# Patient Record
Sex: FEMALE | Race: WHITE | NOT HISPANIC OR LATINO | Employment: UNEMPLOYED | URBAN - METROPOLITAN AREA
[De-identification: names, ages, dates, MRNs, and addresses within clinical notes are randomized per-mention and may not be internally consistent; named-entity substitution may affect disease eponyms.]

---

## 2024-03-26 PROBLEM — R22.9 SUBCUTANEOUS MASS: Status: ACTIVE | Noted: 2024-03-26

## 2024-03-26 PROBLEM — S93.601A SPRAIN OF RIGHT FOOT: Status: ACTIVE | Noted: 2024-03-26

## 2024-03-26 PROBLEM — R03.0 ELEVATED BLOOD PRESSURE READING: Status: ACTIVE | Noted: 2024-03-26

## 2024-03-26 PROBLEM — S61.259A DOG BITE OF FINGER: Status: ACTIVE | Noted: 2024-03-26

## 2024-03-26 PROBLEM — H93.8X1 SENSATION OF PLUGGED EAR ON RIGHT SIDE: Status: ACTIVE | Noted: 2024-03-26
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
## 2024-03-26 PROBLEM — S69.91XA INJURY OF RIGHT INDEX FINGER: Status: ACTIVE | Noted: 2024-03-26

## 2024-03-26 PROBLEM — E11.9 DIABETES TYPE 2, CONTROLLED (MULTI): Status: ACTIVE | Noted: 2024-03-26

## 2024-03-26 PROBLEM — E66.9 OBESITY (BMI 30-39.9): Status: ACTIVE | Noted: 2024-03-26

## 2024-04-02 ENCOUNTER — OFFICE VISIT (OUTPATIENT)
Dept: CARDIOLOGY | Facility: CLINIC | Age: 39
End: 2024-04-02
Payer: COMMERCIAL

## 2024-04-02 VITALS
WEIGHT: 232 LBS | BODY MASS INDEX: 37.45 KG/M2 | SYSTOLIC BLOOD PRESSURE: 154 MMHG | OXYGEN SATURATION: 97 % | HEART RATE: 87 BPM | DIASTOLIC BLOOD PRESSURE: 92 MMHG

## 2024-04-02 DIAGNOSIS — I10 PRIMARY HYPERTENSION: Primary | ICD-10-CM

## 2024-04-02 DIAGNOSIS — Z79.4 CONTROLLED TYPE 2 DIABETES MELLITUS WITH COMPLICATION, WITH LONG-TERM CURRENT USE OF INSULIN (MULTI): ICD-10-CM

## 2024-04-02 DIAGNOSIS — E66.9 OBESITY (BMI 30-39.9): ICD-10-CM

## 2024-04-02 DIAGNOSIS — E11.8 CONTROLLED TYPE 2 DIABETES MELLITUS WITH COMPLICATION, WITH LONG-TERM CURRENT USE OF INSULIN (MULTI): ICD-10-CM

## 2024-04-02 PROCEDURE — 1036F TOBACCO NON-USER: CPT | Performed by: STUDENT IN AN ORGANIZED HEALTH CARE EDUCATION/TRAINING PROGRAM

## 2024-04-02 PROCEDURE — 99204 OFFICE O/P NEW MOD 45 MIN: CPT | Performed by: STUDENT IN AN ORGANIZED HEALTH CARE EDUCATION/TRAINING PROGRAM

## 2024-04-02 PROCEDURE — 4010F ACE/ARB THERAPY RXD/TAKEN: CPT | Performed by: STUDENT IN AN ORGANIZED HEALTH CARE EDUCATION/TRAINING PROGRAM

## 2024-04-02 PROCEDURE — 3080F DIAST BP >= 90 MM HG: CPT | Performed by: STUDENT IN AN ORGANIZED HEALTH CARE EDUCATION/TRAINING PROGRAM

## 2024-04-02 PROCEDURE — 3077F SYST BP >= 140 MM HG: CPT | Performed by: STUDENT IN AN ORGANIZED HEALTH CARE EDUCATION/TRAINING PROGRAM

## 2024-04-02 RX ORDER — ONDANSETRON 4 MG/1
TABLET, ORALLY DISINTEGRATING ORAL
COMMUNITY
Start: 2024-03-19

## 2024-04-02 RX ORDER — LANOLIN ALCOHOL/MO/W.PET/CERES
1 CREAM (GRAM) TOPICAL DAILY
COMMUNITY
Start: 2024-03-11

## 2024-04-02 RX ORDER — ERYTHROMYCIN ETHYLSUCCINATE 200 MG/5ML
SUSPENSION ORAL
COMMUNITY
Start: 2024-03-25

## 2024-04-02 RX ORDER — CALCIPOTRIENE 50 UG/G
OINTMENT TOPICAL
COMMUNITY
Start: 2021-12-13

## 2024-04-02 RX ORDER — CLOBETASOL PROPIONATE 0.05 G/100ML
SHAMPOO TOPICAL
COMMUNITY
Start: 2024-03-21

## 2024-04-02 RX ORDER — INSULIN LISPRO 100 [IU]/ML
INJECTION, SOLUTION INTRAVENOUS; SUBCUTANEOUS
COMMUNITY
Start: 2022-11-07

## 2024-04-02 RX ORDER — PANTOPRAZOLE SODIUM 40 MG/1
40 TABLET, DELAYED RELEASE ORAL 2 TIMES DAILY
COMMUNITY

## 2024-04-02 RX ORDER — BLOOD-GLUCOSE TRANSMITTER
EACH MISCELLANEOUS
COMMUNITY
Start: 2024-01-14

## 2024-04-02 RX ORDER — BLOOD-GLUCOSE SENSOR
EACH MISCELLANEOUS
COMMUNITY
Start: 2024-03-15

## 2024-04-02 RX ORDER — GABAPENTIN 300 MG/1
300 CAPSULE ORAL 2 TIMES DAILY
COMMUNITY

## 2024-04-02 RX ORDER — ESCITALOPRAM OXALATE 10 MG/1
TABLET ORAL
COMMUNITY

## 2024-04-02 RX ORDER — LOSARTAN POTASSIUM 100 MG/1
100 TABLET ORAL DAILY
Qty: 90 TABLET | Refills: 3 | Status: SHIPPED | OUTPATIENT
Start: 2024-04-02 | End: 2025-04-02

## 2024-04-02 RX ORDER — LOSARTAN POTASSIUM 50 MG/1
50 TABLET ORAL DAILY
COMMUNITY
Start: 2024-03-08 | End: 2024-04-02 | Stop reason: SDUPTHER

## 2024-04-02 RX ORDER — INSULIN GLARGINE 100 [IU]/ML
50 INJECTION, SOLUTION SUBCUTANEOUS NIGHTLY
COMMUNITY

## 2024-04-02 RX ORDER — LEVOTHYROXINE SODIUM 175 UG/1
175 TABLET ORAL DAILY
COMMUNITY

## 2024-04-02 RX ORDER — ERGOCALCIFEROL 1.25 MG/1
50000 CAPSULE ORAL WEEKLY
COMMUNITY

## 2024-04-02 RX ORDER — BLOOD-GLUCOSE METER
EACH MISCELLANEOUS
COMMUNITY
Start: 2024-03-19

## 2024-04-02 ASSESSMENT — ENCOUNTER SYMPTOMS
PSYCHIATRIC NEGATIVE: 1
ENDOCRINE NEGATIVE: 1
NEAR-SYNCOPE: 0
MUSCULOSKELETAL NEGATIVE: 1
SYNCOPE: 0
HEMATOLOGIC/LYMPHATIC NEGATIVE: 1
ALLERGIC/IMMUNOLOGIC NEGATIVE: 1
CONSTITUTIONAL NEGATIVE: 1
DYSPNEA ON EXERTION: 0
NEUROLOGICAL NEGATIVE: 1
ORTHOPNEA: 0
PND: 0
RESPIRATORY NEGATIVE: 1
PALPITATIONS: 0
EYES NEGATIVE: 1
HEARTBURN: 1

## 2024-04-02 NOTE — PATIENT INSTRUCTIONS
For your high blood pressure we will check a heart and kidney ultrasound.     For your high blood pressure treatment we will increase losartan to 100 mg daily.     We will see you back in heart clinic after your testing.     Please call my nurse Alexus with any questions; her contact information is below.     Thank you for your visit today. Please contact our office (via [employer] or phone) with any additional questions.     [facility] [place]    EVERARDO Vaughan/Clinic Nurse for:    Dr. Titus Levine    [address], [address]  [place] [address]    Phone: [phone] Press Option 5 then Option 3 to speak with the Clinic Nurse (Alexus)    _____    To Reach:    Billing Questions -    [phone]  Scheduling / Rescheduling -  Option 1  Refills / Medication Requests -  Option 3  General Office /  -  Option 4  Results -     Option 6  Medical Records -    Option 7  Repeat Options -    Option 9

## 2024-04-02 NOTE — PROGRESS NOTES
Cardiology New Patient History and Physical    Reason for referral: Hypertension    HPI: Tk Barrientos is a 38 y.o.  female who presents today for hypertension. Past medical history of hypertension, Type II DM on insulin, GERD, hypothyroidism, and obesity.     Tk presented to cardiology clinic on 2024.  Patient notes recent hypertension. Initially treated with lisinopril > stopped due to allergy.  Switched to losartan 50 mg daily.  BP running 140-150/90 mmHg.  Patient notes intermittent epigastric pain that she attributes to GERD.  She also notes right arm paresthesias over the last couple years.  Patient has been Type II DM for ~ 10 years; currently on insulin.  Former tobacco smoker (14 pack year history; quit 3 years ago); occasional marijuana use.    Past Medical History:   - As above    Surgical History:   She has a past surgical history that includes Tonsillectomy (2018);  section, classic (2018); Other surgical history (2020); and Other surgical history (2020).    Family History:   Family History   Problem Relation Name Age of Onset    Diabetes Mother      Colon cancer Mother           age 57 from colon cancer    Diabetes Father           from COVID / Pneumonia    Kidney disease Father       Allergies:  Kiwi and Lisinopril     Social History:   - Non smoker; no illicit drug use  - 4 kids    Prior Cardiovascular Testing (personally reviewed):     ECG (3/18/2024)- sinus rhythm; prolonged Qtc (497 ms)        Review of Systems:  Review of Systems   Constitutional: Negative.   HENT: Negative.     Eyes: Negative.    Cardiovascular:  Negative for dyspnea on exertion, near-syncope, orthopnea, palpitations, paroxysmal nocturnal dyspnea and syncope.        + atypical chest pain (epigastric > patient attributes to GERD); right arm pains   Respiratory: Negative.     Endocrine: Negative.    Hematologic/Lymphatic: Negative.    Skin: Negative.    Musculoskeletal:  Negative.    Gastrointestinal:  Positive for heartburn.   Genitourinary: Negative.    Neurological: Negative.    Psychiatric/Behavioral: Negative.     Allergic/Immunologic: Negative.        Objective     Outpatient Medications:    Current Outpatient Medications:     calcipotriene (Dovonex) 0.005 % ointment, APPLY TOPICALLY 2 TIMES DAILY. APPLY THIN LAYER TO AFFECTED AREA., Disp: , Rfl:     clobetasoL 0.05 % shampoo, Apply topically once daily., Disp: , Rfl:     Dexcom G6 Sensor device, CHANGE SENSOR EVERY 10 DAYS TO CHECK BLOOD SUGARS, Disp: , Rfl:     Dexcom G6 Transmitter device, USE ONE FOR 3 MONTHS AS DIRECTED.*09/25 PER INS, Disp: , Rfl:     ergocalciferol (Vitamin D-2) 1.25 MG (52260 UT) capsule, Take 1 capsule (50,000 Units) by mouth once a week., Disp: , Rfl:     erythromycin ethylsuccinate (EES) 200 mg/5 mL suspension, PLEASE SEE ATTACHED FOR DETAILED DIRECTIONS, Disp: , Rfl:     escitalopram (Lexapro) 10 mg tablet, TAKE 1 TABLET (10 MG) BY MOUTH DAILY., Disp: , Rfl:     gabapentin (Neurontin) 300 mg capsule, Take 1 capsule (300 mg) by mouth 2 times a day., Disp: , Rfl:     insulin lispro (HumaLOG) 100 unit/mL injection, 12-12-16 with meals, Subcutaneous, TIDAC, plus sliding scale and extra based on carbs. May take upto 75 units a day, 45 mL, Date: 08/07/2023 09:28:00 EDT, [facility]/pharmacy #[id number], Injection, 12-12-16 with meals Subcutaneous TIDAC,Instr:plus sliding scale a..., Disp: , Rfl:     Lantus U-100 Insulin 100 unit/mL injection, Inject 50 Units under the skin once daily at bedtime., Disp: , Rfl:     levothyroxine (Synthroid, Levoxyl) 175 mcg tablet, Take 1 tablet (175 mcg) by mouth once daily., Disp: , Rfl:     losartan (Cozaar) 50 mg tablet, Take 1 tablet (50 mg) by mouth once daily., Disp: , Rfl:     magnesium oxide (Mag-Ox) 400 mg (241.3 mg magnesium) tablet, Take 1 tablet (400 mg) by mouth once daily. Take with food., Disp: , Rfl:     ondansetron ODT (Zofran-ODT) 4 mg disintegrating tablet, DISSOLVE  1 TABLET ON THE TONGUE EVERY 8 HOURS AS NEEDED, Disp: , Rfl:     OneTouch Verio test strips strip, USE TO TEST ONCE DAILY, Disp: , Rfl:     pantoprazole (ProtoNix) 40 mg EC tablet, Take 1 tablet (40 mg) by mouth 2 times a day., Disp: , Rfl:      Last Recorded Vitals  BP (!) 154/92 (BP Location: Right arm, Patient Position: Sitting, BP Cuff Size: Adult)   Pulse 87   Wt 105 kg (232 lb)   SpO2 97%   BMI 37.45 kg/m²     Physical Exam:  Physical Exam  Constitutional:       General: She is not in acute distress.     Appearance: She is obese.   HENT:      Head: Normocephalic.      Mouth/Throat:      Mouth: Mucous membranes are moist.   Eyes:      Extraocular Movements: Extraocular movements intact.      Conjunctiva/sclera: Conjunctivae normal.   Neck:      Vascular: No carotid bruit or JVD.   Cardiovascular:      Rate and Rhythm: Normal rate and regular rhythm.      Pulses: Normal pulses.      Heart sounds: No murmur heard.  Pulmonary:      Effort: Pulmonary effort is normal. No respiratory distress.      Breath sounds: Normal breath sounds.   Abdominal:      General: Bowel sounds are normal. There is no distension.      Palpations: Abdomen is soft.   Musculoskeletal:         General: No swelling.   Skin:     General: Skin is warm and dry.   Neurological:      General: No focal deficit present.      Mental Status: She is alert.      Cranial Nerves: No cranial nerve deficit.      Motor: No weakness.   Psychiatric:         Mood and Affect: Mood normal.         Behavior: Behavior normal.         Lab Review:    Lipid Panel (9/18/2023)- 156, HDL 32, LDL 93, triglycerides 156 mg/dl    HgbA1c 8.3% (9/2023)    Assessment:   38 y.o.  female who presents today for hypertension. Past medical history of hypertension, Type II DM on insulin, GERD, hypothyroidism, and obesity.     Patient with suboptimally controlled hypertension.  Will uptitrate losartan to 100 mg daily.  Check hCG given delayed period to assess for  pregnancy.  If patient is pregnant losartan would have to be stopped.  Check transthoracic echocardiogram and renal artery ultrasound for further evaluation of hypertension.    Overall Plan:  1.  Hypertension (goal blood pressure less than 130/90 mmHg)  - Lisinopril previously stopped secondary to allergy  - Hypertension remains suboptimally controlled  - Increase losartan to 100 mg daily  - If hypertension suboptimally controlled with then consider amlodipine or spironolactone  - Check complete transthoracic echocardiogram and renal artery ultrasound; check plasma metanephrines    2.  Delayed menstrual cycle  - Check urine hCG to screen for pregnancy  - If patient is pregnant would then need to stop losartan and switch to nifedipine    3.  Diabetes mellitus on insulin  - Continue insulin as per endocrinology  - Consider semaglutide given obesity and diabetes    4.  Hypothyroidism  - Continue levothyroxine as per endocrinology    5.  Obesity  - Discussed dietary and lifestyle modifications  - Encouraged regular physical activity    Disposition: Return to cardiology clinic after above testing    Javier Qureshi MD

## 2024-04-03 ENCOUNTER — HOSPITAL ENCOUNTER (OUTPATIENT)
Dept: VASCULAR MEDICINE | Facility: CLINIC | Age: 39
Discharge: HOME | End: 2024-04-03
Payer: COMMERCIAL

## 2024-04-03 DIAGNOSIS — I10 PRIMARY HYPERTENSION: ICD-10-CM

## 2024-04-29 ENCOUNTER — HOSPITAL ENCOUNTER (OUTPATIENT)
Dept: VASCULAR MEDICINE | Facility: CLINIC | Age: 39
Discharge: HOME | End: 2024-04-29
Payer: COMMERCIAL

## 2024-04-29 DIAGNOSIS — I10 PRIMARY HYPERTENSION: ICD-10-CM

## 2024-04-29 PROCEDURE — 93975 VASCULAR STUDY: CPT | Performed by: SURGERY

## 2024-04-29 PROCEDURE — 93975 VASCULAR STUDY: CPT

## 2024-05-10 ENCOUNTER — TELEPHONE (OUTPATIENT)
Dept: CARDIOLOGY | Facility: CLINIC | Age: 39
End: 2024-05-10
Payer: COMMERCIAL

## 2024-05-10 NOTE — TELEPHONE ENCOUNTER
----- Message from Javier Qureshi MD sent at 5/9/2024  6:31 PM EDT -----  Please let patient know that her renal artery ultrasound showed normal blood flow to the kidneys; incidental finding of kidney cyst.    Javier Qureshi MD    ----- Message -----  From: Ric, Syngo - Cardiology Results In  Sent: 5/1/2024   6:41 PM EDT  To: Javier Qureshi MD

## 2024-05-30 ENCOUNTER — APPOINTMENT (OUTPATIENT)
Dept: CARDIOLOGY | Facility: CLINIC | Age: 39
End: 2024-05-30
Payer: COMMERCIAL

## 2024-07-08 PROBLEM — E11.43 DIABETIC GASTROPARESIS (MULTI): Status: ACTIVE | Noted: 2024-03-18

## 2024-07-08 PROBLEM — E63.9 POOR DIET: Status: ACTIVE | Noted: 2024-07-08

## 2024-07-08 PROBLEM — R13.10 DYSPHAGIA: Status: ACTIVE | Noted: 2024-07-08

## 2024-07-08 PROBLEM — E27.8 ADRENAL MASS (MULTI): Status: ACTIVE | Noted: 2024-07-08

## 2024-07-08 PROBLEM — Z91.199 NON-ADHERENCE TO MEDICAL TREATMENT: Status: ACTIVE | Noted: 2024-07-08

## 2024-07-08 PROBLEM — F41.9 ANXIETY: Status: ACTIVE | Noted: 2024-07-08

## 2024-07-08 PROBLEM — L40.9 PSORIASIS: Status: ACTIVE | Noted: 2023-05-04

## 2024-07-08 PROBLEM — R73.9 HYPERGLYCEMIA: Status: ACTIVE | Noted: 2017-04-28

## 2024-07-08 PROBLEM — F12.10 MARIJUANA ABUSE: Status: ACTIVE | Noted: 2024-07-08

## 2024-07-08 PROBLEM — K31.84 DIABETIC GASTROPARESIS (MULTI): Status: ACTIVE | Noted: 2024-03-18

## 2024-07-08 PROBLEM — E86.0 DEHYDRATION: Status: ACTIVE | Noted: 2024-07-08

## 2024-07-08 PROBLEM — K31.84 GASTROPARESIS: Status: ACTIVE | Noted: 2022-03-14

## 2024-07-08 PROBLEM — E89.0 POSTSURGICAL HYPOTHYROIDISM: Status: ACTIVE | Noted: 2022-03-31

## 2024-07-08 PROBLEM — K21.9 ESOPHAGEAL REFLUX DISEASE: Status: ACTIVE | Noted: 2021-11-16

## 2024-07-10 ENCOUNTER — APPOINTMENT (OUTPATIENT)
Dept: CARDIOLOGY | Facility: CLINIC | Age: 39
End: 2024-07-10
Payer: COMMERCIAL

## 2024-07-22 ENCOUNTER — APPOINTMENT (OUTPATIENT)
Dept: CARDIOLOGY | Facility: CLINIC | Age: 39
End: 2024-07-22
Payer: COMMERCIAL

## 2024-07-24 ENCOUNTER — APPOINTMENT (OUTPATIENT)
Dept: CARDIOLOGY | Facility: CLINIC | Age: 39
End: 2024-07-24
Payer: COMMERCIAL

## 2024-07-30 PROBLEM — R03.0 FINDING OF ABOVE NORMAL BLOOD PRESSURE: Status: ACTIVE | Noted: 2024-07-30

## 2024-08-21 ENCOUNTER — APPOINTMENT (OUTPATIENT)
Dept: CARDIOLOGY | Facility: CLINIC | Age: 39
End: 2024-08-21
Payer: COMMERCIAL

## 2024-09-17 ENCOUNTER — APPOINTMENT (OUTPATIENT)
Dept: CARDIOLOGY | Facility: CLINIC | Age: 39
End: 2024-09-17
Payer: COMMERCIAL

## 2024-09-18 ENCOUNTER — APPOINTMENT (OUTPATIENT)
Dept: CARDIOLOGY | Facility: CLINIC | Age: 39
End: 2024-09-18
Payer: COMMERCIAL

## 2025-01-06 ENCOUNTER — PATIENT OUTREACH (OUTPATIENT)
Dept: CARE COORDINATION | Facility: CLINIC | Age: 40
End: 2025-01-06
Payer: COMMERCIAL

## 2025-01-06 NOTE — PROGRESS NOTES
Outreach call to patient to support a smooth transition of care from recent admission.  Unable to leave a voicemail message for patient with my contact information.  Yue Moyer RN  [phone]

## 2025-04-08 DIAGNOSIS — E11.8 CONTROLLED TYPE 2 DIABETES MELLITUS WITH COMPLICATION, WITH LONG-TERM CURRENT USE OF INSULIN (MULTI): ICD-10-CM

## 2025-04-08 DIAGNOSIS — I10 PRIMARY HYPERTENSION: ICD-10-CM

## 2025-04-08 DIAGNOSIS — E66.9 OBESITY (BMI 30-39.9): ICD-10-CM

## 2025-04-08 DIAGNOSIS — Z79.4 CONTROLLED TYPE 2 DIABETES MELLITUS WITH COMPLICATION, WITH LONG-TERM CURRENT USE OF INSULIN (MULTI): ICD-10-CM

## 2025-04-10 RX ORDER — LOSARTAN POTASSIUM 100 MG/1
100 TABLET ORAL DAILY
Qty: 30 TABLET | Refills: 0 | Status: SHIPPED | OUTPATIENT
Start: 2025-04-10

## 2025-05-09 ENCOUNTER — PATIENT OUTREACH (OUTPATIENT)
Dept: CARE COORDINATION | Facility: CLINIC | Age: 40
End: 2025-05-09
Payer: COMMERCIAL

## 2025-05-09 NOTE — PROGRESS NOTES
Outreach call to patient to support a smooth transition of care from recent admission.  Unable to leave a voicemail message for patient with my contact information.

## 2025-07-17 ENCOUNTER — PATIENT MESSAGE (OUTPATIENT)
Dept: CARE COORDINATION | Facility: CLINIC | Age: 40
End: 2025-07-17
Payer: COMMERCIAL

## 2025-07-31 ENCOUNTER — PATIENT OUTREACH (OUTPATIENT)
Dept: CARE COORDINATION | Facility: CLINIC | Age: 40
End: 2025-07-31
Payer: COMMERCIAL